# Patient Record
Sex: MALE | Race: WHITE | NOT HISPANIC OR LATINO | Employment: FULL TIME | URBAN - METROPOLITAN AREA
[De-identification: names, ages, dates, MRNs, and addresses within clinical notes are randomized per-mention and may not be internally consistent; named-entity substitution may affect disease eponyms.]

---

## 2017-06-20 ENCOUNTER — ALLSCRIPTS OFFICE VISIT (OUTPATIENT)
Dept: OTHER | Facility: OTHER | Age: 52
End: 2017-06-20

## 2018-01-22 VITALS
WEIGHT: 244 LBS | SYSTOLIC BLOOD PRESSURE: 136 MMHG | DIASTOLIC BLOOD PRESSURE: 72 MMHG | BODY MASS INDEX: 34.16 KG/M2 | HEART RATE: 84 BPM | HEIGHT: 71 IN

## 2018-06-05 ENCOUNTER — OFFICE VISIT (OUTPATIENT)
Dept: FAMILY MEDICINE CLINIC | Facility: CLINIC | Age: 53
End: 2018-06-05

## 2018-06-05 VITALS
HEART RATE: 82 BPM | DIASTOLIC BLOOD PRESSURE: 86 MMHG | SYSTOLIC BLOOD PRESSURE: 124 MMHG | HEIGHT: 71 IN | BODY MASS INDEX: 34.3 KG/M2 | WEIGHT: 245 LBS

## 2018-06-05 DIAGNOSIS — Z02.89 ENCOUNTER FOR FEDERAL AVIATION ADMINISTRATION (FAA) EXAMINATION: Primary | ICD-10-CM

## 2018-06-05 PROCEDURE — 93000 ELECTROCARDIOGRAM COMPLETE: CPT | Performed by: FAMILY MEDICINE

## 2018-06-05 PROCEDURE — 99499 UNLISTED E&M SERVICE: CPT | Performed by: FAMILY MEDICINE

## 2018-06-29 DIAGNOSIS — B00.1 FEVER BLISTER: Primary | ICD-10-CM

## 2018-06-29 RX ORDER — VALACYCLOVIR HYDROCHLORIDE 1 G/1
TABLET, FILM COATED ORAL
Qty: 90 TABLET | Refills: 2 | Status: SHIPPED | OUTPATIENT
Start: 2018-06-29 | End: 2019-09-20 | Stop reason: SDUPTHER

## 2019-09-20 DIAGNOSIS — B00.1 FEVER BLISTER: ICD-10-CM

## 2019-09-20 RX ORDER — VALACYCLOVIR HYDROCHLORIDE 1 G/1
TABLET, FILM COATED ORAL
Qty: 90 TABLET | Refills: 2 | Status: SHIPPED | OUTPATIENT
Start: 2019-09-20 | End: 2020-12-17

## 2020-12-17 DIAGNOSIS — B00.1 FEVER BLISTER: ICD-10-CM

## 2020-12-17 RX ORDER — VALACYCLOVIR HYDROCHLORIDE 1 G/1
TABLET, FILM COATED ORAL
Qty: 90 TABLET | Refills: 2 | Status: SHIPPED | OUTPATIENT
Start: 2020-12-17 | End: 2021-12-02

## 2021-12-02 DIAGNOSIS — B00.1 FEVER BLISTER: ICD-10-CM

## 2021-12-02 RX ORDER — VALACYCLOVIR HYDROCHLORIDE 1 G/1
TABLET, FILM COATED ORAL
Qty: 90 TABLET | Refills: 2 | Status: SHIPPED | OUTPATIENT
Start: 2021-12-02 | End: 2022-05-31

## 2023-02-15 ENCOUNTER — OFFICE VISIT (OUTPATIENT)
Dept: URGENT CARE | Facility: MEDICAL CENTER | Age: 58
End: 2023-02-15

## 2023-02-15 VITALS
DIASTOLIC BLOOD PRESSURE: 100 MMHG | HEIGHT: 71 IN | WEIGHT: 235 LBS | TEMPERATURE: 98.7 F | SYSTOLIC BLOOD PRESSURE: 152 MMHG | BODY MASS INDEX: 32.9 KG/M2 | OXYGEN SATURATION: 96 % | HEART RATE: 88 BPM

## 2023-02-15 DIAGNOSIS — R05.1 ACUTE COUGH: Primary | ICD-10-CM

## 2023-02-15 DIAGNOSIS — U07.1 COVID-19: ICD-10-CM

## 2023-02-15 RX ORDER — FLUTICASONE PROPIONATE 50 MCG
1 SPRAY, SUSPENSION (ML) NASAL DAILY
Qty: 18.2 ML | Refills: 0 | Status: SHIPPED | OUTPATIENT
Start: 2023-02-15

## 2023-02-15 RX ORDER — LISINOPRIL 10 MG/1
10 TABLET ORAL DAILY
COMMUNITY
Start: 2023-01-15

## 2023-02-15 RX ORDER — BENZONATATE 200 MG/1
200 CAPSULE ORAL 3 TIMES DAILY PRN
Qty: 20 CAPSULE | Refills: 0 | Status: SHIPPED | OUTPATIENT
Start: 2023-02-15

## 2023-02-15 RX ORDER — VALACYCLOVIR HYDROCHLORIDE 1 G/1
1 TABLET, FILM COATED ORAL DAILY
COMMUNITY
Start: 2017-06-20

## 2023-02-15 NOTE — LETTER
February 15, 2023     Patient: Quoc Hilton   YOB: 1965   Date of Visit: 2/15/2023       To Whom It May Concern: It is my medical opinion that Ramya Reyes continues to experience effects of his recent COVID-19 infection including cough and congestion  He may return to work on Monday, 2/30/2023, as long as he remains fever free without the use of anti-fever medication such as Tylenol  If you have any questions or concerns, please don't hesitate to call           Sincerely,        Ishmael Saavedra PA-C    CC: No Recipients

## 2023-02-15 NOTE — PATIENT INSTRUCTIONS
- Take Tessalon and Flonase as prescribed for cough and congestion  - May also use throat lozenges, tea and honey for symptom relief   - Follow up with PCP in 3-5 days   - Proceed to ER if you experience shortness of breath, chest pain, dizziness

## 2023-02-15 NOTE — LETTER
February 15, 2023     Patient: Hailee Cárdenas   YOB: 1965   Date of Visit: 2/15/2023       To Whom It May Concern: It is my medical opinion that Celina Foreman continues to experience effects of his recent COVID-19 infection including cough and congestion  He may return to work on Monday, 2/20/2023, as long as he remains fever free without the use of anti-fever medication such as Tylenol  If you have any questions or concerns, please don't hesitate to call           Sincerely,        Timur Ferrara PA-C    CC: No Recipients

## 2023-02-15 NOTE — PROGRESS NOTES
[facility]        NAME: Carlos Elizabeth is a 65 y o  male  : 1965    MRN: [id number]  DATE: February 15, 2023  TIME: 10:47 AM    Assessment and Plan   Acute cough [R05 1]  1  Acute cough  benzonatate (TESSALON) 200 MG capsule    fluticasone (FLONASE) 50 mcg/act nasal spray      2  COVID-19  benzonatate (TESSALON) 200 MG capsule    fluticasone (FLONASE) 50 mcg/act nasal spray            Patient Instructions     - Take Tessalon and Flonase as prescribed for cough and congestion  - May also use throat lozenges, tea and honey for symptom relief   - Follow up with PCP in 3-5 days   - Proceed to ER if you experience shortness of breath, chest pain, dizziness      Chief Complaint     Chief Complaint   Patient presents with   • Cough     Patient presents with prolonged symptoms from Covid  He tested positive on the   He still is having cough, runny nose, and fatigue         History of Present Illness       60-year-old male presents to the office today for evaluation of ongoing cough and congestion  Patient states he was COVID-positive on 2023  He notes that most of his symptoms have resolved, however his cough remains  He denies any fever or chills  Patient denies any nausea, vomiting, diarrhea  He denies any shortness of breath or chest pain  Patient denies any history of asthma or COPD  For symptom relief he has been taken Tylenol and Mucinex  The cough is productive, clear mucus  Review of Systems   Review of Systems   Constitutional: Negative for chills and fever  HENT: Positive for congestion and postnasal drip  Negative for ear pain and sore throat  Eyes: Negative for pain and visual disturbance  Respiratory: Positive for cough  Negative for chest tightness, shortness of breath and wheezing  Cardiovascular: Negative for chest pain and palpitations  Gastrointestinal: Negative for abdominal pain and vomiting  Genitourinary: Negative for dysuria and hematuria  Musculoskeletal: Negative for arthralgias and back pain  Skin: Negative for color change and rash  Neurological: Negative for dizziness, seizures, syncope, light-headedness and headaches  All other systems reviewed and are negative  Current Medications       Current Outpatient Medications:   •  benzonatate (TESSALON) 200 MG capsule, Take 1 capsule (200 mg total) by mouth 3 (three) times a day as needed for cough, Disp: 20 capsule, Rfl: 0  •  fluticasone (FLONASE) 50 mcg/act nasal spray, 1 spray into each nostril daily, Disp: 18 2 mL, Rfl: 0  •  valACYclovir (VALTREX) 1,000 mg tablet, Take 1 tablet by mouth daily, Disp: , Rfl:   •  lisinopril (ZESTRIL) 10 mg tablet, Take 10 mg by mouth daily, Disp: , Rfl:   •  valACYclovir (VALTREX) 1,000 mg tablet, TAKE 1 TABLET BY MOUTH EVERY DAY, Disp: 90 tablet, Rfl: 2    Current Allergies     Allergies as of 02/15/2023   • (No Known Allergies)            The following portions of the patient's history were reviewed and updated as appropriate: allergies, current medications, past family history, past medical history, past social history, past surgical history and problem list      Past Medical History:   Diagnosis Date   • Hypertension        History reviewed  No pertinent surgical history  Family History   Problem Relation Age of Onset   • No Known Problems Mother          Medications have been verified  Objective   /100   Pulse 88   Temp 98 7 °F (37 1 °C)   Ht 5' 11" (1 803 m)   Wt 107 kg (235 lb)   SpO2 96%   BMI 32 78 kg/m²        Physical Exam     Physical Exam  Vitals and nursing note reviewed  Constitutional:       General: He is not in acute distress  Appearance: Normal appearance  He is not ill-appearing  HENT:      Head: Normocephalic and atraumatic        Right Ear: Tympanic membrane normal       Left Ear: Tympanic membrane normal       Nose: Nose normal       Mouth/Throat:      Mouth: Mucous membranes are moist  Pharynx: Oropharynx is clear  No oropharyngeal exudate or posterior oropharyngeal erythema  Eyes:      Extraocular Movements: Extraocular movements intact  Conjunctiva/sclera: Conjunctivae normal       Pupils: Pupils are equal, round, and reactive to light  Cardiovascular:      Rate and Rhythm: Normal rate and regular rhythm  Pulses: Normal pulses  Heart sounds: No murmur heard  Pulmonary:      Effort: Pulmonary effort is normal  No respiratory distress  Breath sounds: No wheezing or rhonchi  Abdominal:      Palpations: Abdomen is soft  Musculoskeletal:      Cervical back: Normal range of motion  Lymphadenopathy:      Cervical: No cervical adenopathy  Skin:     General: Skin is warm and dry  Capillary Refill: Capillary refill takes less than 2 seconds  Neurological:      General: No focal deficit present  Mental Status: He is alert and oriented to person, place, and time     Psychiatric:         Mood and Affect: Mood normal          Behavior: Behavior normal

## 2023-02-15 NOTE — LETTER
February 15, 2023     Patient: Trinh Gonzales   YOB: 1965   Date of Visit: 2/15/2023       To Whom It May Concern: It is my medical opinion that Princess Mayfield may return to work on Monday, 2/20/2023, as long as he remains fever free without the use of anti-fever medication such as Tylenol  If you have any questions or concerns, please don't hesitate to call           Sincerely,        Gillian Terrazas PA-C    CC: No Recipients

## 2023-11-22 ENCOUNTER — OFFICE VISIT (OUTPATIENT)
Dept: URGENT CARE | Facility: MEDICAL CENTER | Age: 58
End: 2023-11-22
Payer: COMMERCIAL

## 2023-11-22 VITALS
HEART RATE: 100 BPM | DIASTOLIC BLOOD PRESSURE: 68 MMHG | TEMPERATURE: 99.9 F | OXYGEN SATURATION: 94 % | RESPIRATION RATE: 18 BRPM | SYSTOLIC BLOOD PRESSURE: 116 MMHG

## 2023-11-22 DIAGNOSIS — A09 DIARRHEA OF INFECTIOUS ORIGIN: Primary | ICD-10-CM

## 2023-11-22 PROCEDURE — 99213 OFFICE O/P EST LOW 20 MIN: CPT | Performed by: ORTHOPAEDIC SURGERY

## 2023-11-22 RX ORDER — ONDANSETRON 4 MG/1
4 TABLET, FILM COATED ORAL EVERY 8 HOURS PRN
Qty: 20 TABLET | Refills: 0 | Status: SHIPPED | OUTPATIENT
Start: 2023-11-22

## 2023-11-22 RX ORDER — CIPROFLOXACIN 500 MG/1
500 TABLET, FILM COATED ORAL EVERY 12 HOURS SCHEDULED
Qty: 14 TABLET | Refills: 0 | Status: SHIPPED | OUTPATIENT
Start: 2023-11-22 | End: 2023-11-29

## 2023-11-22 NOTE — PROGRESS NOTES
[facility]        NAME: Ruben Rogers is a 63 y.o. male  : 1965    MRN: [id number]  DATE: 2023  TIME: 12:50 PM    Assessment and Plan   Diarrhea of infectious origin [A09]  1. Diarrhea of infectious origin  ciprofloxacin (CIPRO) 500 mg tablet    ondansetron (ZOFRAN) 4 mg tablet        History and exam consistent with viral gastroenteritis versus traveller's diarrhea. Patient Instructions     Take zofran as prescribed for nausea   Fluids (pedialyte) and rest  Broths and clear soups  BRAT diet (bananas, rice, applesauce, and toast)  Progress to normal diet as tolerated  Avoid dairy while symptoms persist  Over-the-counter Tylenol for pain/fever  If symptoms persist over 5-7 days, please take antibiotic as prescribed   Follow up with PCP in 3-5 days. Proceed to  ER if symptoms worsen. Chief Complaint     Chief Complaint   Patient presents with    Diarrhea     Pt C/O diarrhea that started after a return flight from [facility] on Tuesday. Pt reports family at home all while. History of Present Illness       50-year-old male presents to the urgent care for evaluation of diarrhea. The patient states symptoms began yesterday. This past Tuesday he did return from [facility] as he is a  for an airline. He notes throughout the stay in [facility] he stayed mostly at the hotel, but did venture out at one point to explore. He denies any blood in his diarrhea. He denies any abdominal pain or any pertinent medical history. He denies any urinary symptoms. He denies any fever but notes some chills. He has had nausea but no vomiting. He notes he is trying to eat and drink normally, though this is hard with his symptoms. He has not taken any medication for his symptoms. Review of Systems   Review of Systems   Constitutional:  Positive for appetite change and chills. Negative for activity change and fever. HENT:  Negative for congestion, ear pain and sore throat.     Eyes: Negative for pain and visual disturbance. Respiratory:  Negative for cough and shortness of breath. Cardiovascular:  Negative for chest pain and palpitations. Gastrointestinal:  Positive for diarrhea and nausea. Negative for abdominal pain, blood in stool and vomiting. Genitourinary:  Negative for dysuria, flank pain, frequency and hematuria. Musculoskeletal:  Negative for arthralgias and back pain. Skin:  Negative for color change and rash. Neurological:  Negative for dizziness, seizures, syncope, weakness, light-headedness and headaches. Psychiatric/Behavioral: Negative. All other systems reviewed and are negative.         Current Medications       Current Outpatient Medications:     ciprofloxacin (CIPRO) 500 mg tablet, Take 1 tablet (500 mg total) by mouth every 12 (twelve) hours for 7 days, Disp: 14 tablet, Rfl: 0    lisinopril (ZESTRIL) 10 mg tablet, Take 10 mg by mouth daily, Disp: , Rfl:     ondansetron (ZOFRAN) 4 mg tablet, Take 1 tablet (4 mg total) by mouth every 8 (eight) hours as needed for nausea or vomiting, Disp: 20 tablet, Rfl: 0    benzonatate (TESSALON) 200 MG capsule, Take 1 capsule (200 mg total) by mouth 3 (three) times a day as needed for cough (Patient not taking: Reported on 11/22/2023), Disp: 20 capsule, Rfl: 0    fluticasone (FLONASE) 50 mcg/act nasal spray, 1 spray into each nostril daily (Patient not taking: Reported on 11/22/2023), Disp: 18.2 mL, Rfl: 0    valACYclovir (VALTREX) 1,000 mg tablet, Take 1 tablet by mouth daily (Patient not taking: Reported on 11/22/2023), Disp: , Rfl:     Current Allergies     Allergies as of 11/22/2023    (No Known Allergies)            The following portions of the patient's history were reviewed and updated as appropriate: allergies, current medications, past family history, past medical history, past social history, past surgical history and problem list.     Past Medical History:   Diagnosis Date    Hypertension        No past surgical history on file. Family History   Problem Relation Age of Onset    No Known Problems Mother          Medications have been verified. Objective   /68 (BP Location: Right arm, Patient Position: Sitting, Cuff Size: Large)   Pulse 100   Temp 99.9 °F (37.7 °C) (Tympanic)   Resp 18   SpO2 94%        Physical Exam     Physical Exam  Vitals and nursing note reviewed. Constitutional:       General: He is not in acute distress. Appearance: Normal appearance. He is not ill-appearing. HENT:      Head: Normocephalic and atraumatic. Mouth/Throat:      Pharynx: Oropharynx is clear. Eyes:      Extraocular Movements: Extraocular movements intact. Pupils: Pupils are equal, round, and reactive to light. Cardiovascular:      Pulses: Normal pulses. Pulmonary:      Effort: Pulmonary effort is normal. No respiratory distress. Abdominal:      General: Bowel sounds are normal.      Palpations: Abdomen is soft. Tenderness: There is no abdominal tenderness. There is no right CVA tenderness, left CVA tenderness, guarding or rebound. Musculoskeletal:         General: Normal range of motion. Cervical back: Normal range of motion. Skin:     General: Skin is warm and dry. Capillary Refill: Capillary refill takes less than 2 seconds. Neurological:      General: No focal deficit present. Mental Status: He is alert and oriented to person, place, and time. Psychiatric:         Mood and Affect: Mood normal.         Behavior: Behavior normal.         Thought Content:  Thought content normal.         Judgment: Judgment normal.

## 2023-11-22 NOTE — PATIENT INSTRUCTIONS
Take zofran as prescribed for nausea   Fluids (pedialyte) and rest  Broths and clear soups  BRAT diet (bananas, rice, applesauce, and toast)  Progress to normal diet as tolerated  Avoid dairy while symptoms persist  Over-the-counter Tylenol for pain/fever  If symptoms persist over 5-7 days, please take antibiotic as prescribed   Follow up with PCP in 3-5 days. Proceed to  ER if symptoms worsen.